# Patient Record
Sex: FEMALE | URBAN - METROPOLITAN AREA
[De-identification: names, ages, dates, MRNs, and addresses within clinical notes are randomized per-mention and may not be internally consistent; named-entity substitution may affect disease eponyms.]

---

## 2021-05-06 ENCOUNTER — OFFICE VISIT (OUTPATIENT)
Dept: OPHTHALMOLOGY | Age: 56
End: 2021-05-06

## 2021-05-06 DIAGNOSIS — H35.363 DRUSEN OF MACULA OF BOTH EYES: Primary | ICD-10-CM

## 2021-05-06 PROCEDURE — 92004 COMPRE OPH EXAM NEW PT 1/>: CPT | Performed by: OPHTHALMOLOGY

## 2021-05-06 PROCEDURE — 92134 CPTRZ OPH DX IMG PST SGM RTA: CPT | Performed by: OPHTHALMOLOGY

## 2021-05-06 RX ORDER — LANSOPRAZOLE 30 MG/1
30 CAPSULE, DELAYED RELEASE ORAL
COMMUNITY
Start: 2021-02-24

## 2021-05-06 RX ORDER — OMEPRAZOLE 20 MG/1
20 CAPSULE, DELAYED RELEASE ORAL
COMMUNITY
Start: 2005-01-04

## 2021-05-06 RX ORDER — TRIAMTERENE AND HYDROCHLOROTHIAZIDE 37.5; 25 MG/1; MG/1
1 CAPSULE ORAL
COMMUNITY
Start: 2014-05-30

## 2021-05-06 RX ORDER — DAPSONE 100 MG/1
100 TABLET ORAL DAILY
COMMUNITY
Start: 2021-04-06

## 2021-05-06 ASSESSMENT — VISUAL ACUITY
OD_CC: 20/20
OS_CC: 20/20
CORRECTION_TYPE: GLASSES
OS_CC+: -2
METHOD: SNELLEN- LINEAR

## 2021-05-06 ASSESSMENT — CONF VISUAL FIELD
OD_NORMAL: 1
OS_NORMAL: 1

## 2021-05-06 ASSESSMENT — EXTERNAL EXAM - RIGHT EYE: OD_EXAM: NORMAL

## 2021-05-06 ASSESSMENT — CUP TO DISC RATIO
OS_RATIO: 0.4
OD_RATIO: 0.4

## 2021-05-06 ASSESSMENT — TONOMETRY
OS_IOP_MMHG: 17
IOP_METHOD: APPLANATION
OD_IOP_MMHG: 18

## 2021-05-06 ASSESSMENT — SLIT LAMP EXAM - LIDS
COMMENTS: NORMAL
COMMENTS: NORMAL

## 2021-05-06 ASSESSMENT — EXTERNAL EXAM - LEFT EYE: OS_EXAM: NORMAL

## 2023-10-19 ENCOUNTER — OFFICE VISIT (OUTPATIENT)
Dept: OBGYN CLINIC | Facility: CLINIC | Age: 58
End: 2023-10-19

## 2023-10-19 ENCOUNTER — TELEPHONE (OUTPATIENT)
Dept: OBGYN CLINIC | Facility: CLINIC | Age: 58
End: 2023-10-19

## 2023-10-19 VITALS — HEIGHT: 59 IN | BODY MASS INDEX: 39.96 KG/M2 | WEIGHT: 198.19 LBS

## 2023-10-19 DIAGNOSIS — Z01.419 ENCOUNTER FOR WELL WOMAN EXAM WITH ROUTINE GYNECOLOGICAL EXAM: Primary | ICD-10-CM

## 2023-10-19 DIAGNOSIS — R30.0 DYSURIA: ICD-10-CM

## 2023-10-19 DIAGNOSIS — Z12.4 ENCOUNTER FOR PAPANICOLAOU SMEAR FOR CERVICAL CANCER SCREENING: ICD-10-CM

## 2023-10-19 DIAGNOSIS — Z12.31 ENCOUNTER FOR SCREENING MAMMOGRAM FOR BREAST CANCER: ICD-10-CM

## 2023-10-19 DIAGNOSIS — B35.4 TINEA CORPORIS: ICD-10-CM

## 2023-10-19 PROBLEM — L03.113 CELLULITIS OF RIGHT UPPER EXTREMITY: Status: ACTIVE | Noted: 2023-03-05

## 2023-10-19 PROBLEM — F32.A DEPRESSION: Status: ACTIVE | Noted: 2023-03-05

## 2023-10-19 PROBLEM — I10 PRIMARY HYPERTENSION: Status: ACTIVE | Noted: 2023-03-05

## 2023-10-19 PROBLEM — K76.0 FATTY LIVER: Status: ACTIVE | Noted: 2018-03-23

## 2023-10-19 PROBLEM — M15.9 PRIMARY OSTEOARTHRITIS INVOLVING MULTIPLE JOINTS: Status: ACTIVE | Noted: 2022-03-29

## 2023-10-19 PROBLEM — F32.5 MAJOR DEPRESSIVE DISORDER WITH SINGLE EPISODE, IN FULL REMISSION (HCC): Status: ACTIVE | Noted: 2021-02-24

## 2023-10-19 PROBLEM — G47.62 NOCTURNAL LEG CRAMPS: Status: ACTIVE | Noted: 2017-11-08

## 2023-10-19 LAB
APPEARANCE: CLEAR
BILIRUBIN: NEGATIVE
GLUCOSE (URINE DIPSTICK): NEGATIVE MG/DL
KETONES (URINE DIPSTICK): NEGATIVE MG/DL
MULTISTIX EXPIRATION DATE: ABNORMAL DATE
MULTISTIX LOT#: ABNORMAL NUMERIC
NITRITE, URINE: POSITIVE
PH, URINE: 8 (ref 4.5–8)
PROTEIN (URINE DIPSTICK): NEGATIVE MG/DL
SPECIFIC GRAVITY: 1.03 (ref 1–1.03)
URINE-COLOR: YELLOW
UROBILINOGEN,SEMI-QN: 0.2 MG/DL (ref 0–1.9)

## 2023-10-19 PROCEDURE — 87086 URINE CULTURE/COLONY COUNT: CPT | Performed by: OBSTETRICS & GYNECOLOGY

## 2023-10-19 PROCEDURE — 87186 SC STD MICRODIL/AGAR DIL: CPT | Performed by: OBSTETRICS & GYNECOLOGY

## 2023-10-19 PROCEDURE — 87077 CULTURE AEROBIC IDENTIFY: CPT | Performed by: OBSTETRICS & GYNECOLOGY

## 2023-10-19 PROCEDURE — 88175 CYTOPATH C/V AUTO FLUID REDO: CPT | Performed by: OBSTETRICS & GYNECOLOGY

## 2023-10-19 RX ORDER — NYSTATIN 100000 U/G
1 CREAM TOPICAL 2 TIMES DAILY
Qty: 30 G | Refills: 1 | Status: SHIPPED | OUTPATIENT
Start: 2023-10-19 | End: 2023-10-19

## 2023-10-19 RX ORDER — NITROFURANTOIN 25; 75 MG/1; MG/1
100 CAPSULE ORAL 2 TIMES DAILY
Qty: 14 CAPSULE | Refills: 0 | Status: SHIPPED | OUTPATIENT
Start: 2023-10-19 | End: 2023-10-26

## 2023-10-19 RX ORDER — NITROFURANTOIN 25; 75 MG/1; MG/1
100 CAPSULE ORAL 2 TIMES DAILY
Qty: 14 CAPSULE | Refills: 0 | Status: SHIPPED | OUTPATIENT
Start: 2023-10-19 | End: 2023-10-19

## 2023-10-19 RX ORDER — NYSTATIN 100000 U/G
1 CREAM TOPICAL 2 TIMES DAILY
Qty: 30 G | Refills: 1 | Status: SHIPPED | OUTPATIENT
Start: 2023-10-19

## 2023-10-25 LAB
.: NORMAL
.: NORMAL

## 2023-10-26 LAB — HPV I/H RISK 1 DNA SPEC QL NAA+PROBE: NEGATIVE

## 2024-05-31 PROCEDURE — 93010 ELECTROCARDIOGRAM REPORT: CPT | Performed by: INTERNAL MEDICINE

## 2024-07-16 NOTE — TELEPHONE ENCOUNTER
Patient verified name and     Patient reports she is experiencing vaginal odor. Denies discharge or irritation. Also reports foul smelling urine. Discussed leaving urine sample at lab to rule out UTI. Agreed. Orders placed.

## 2024-07-16 NOTE — TELEPHONE ENCOUNTER
Patient called to speak with Nurse as she is experiencing vaginal odor that she had before and received prescription. No other symptoms or discharge. [facility] in [place] (on file).

## 2024-07-22 NOTE — TELEPHONE ENCOUNTER
Patient at [facility] lab awaiting order for urine test. Spoke with Nurse today at 12 pm. Fax is [phone].

## 2024-07-22 NOTE — TELEPHONE ENCOUNTER
Name and Date of birth verified    Patient reports still having symptoms. Patient had urinalysis run, but no urine culture. Patient advised she can complete urine culture if still having symptoms. Patient will return for urine culture.

## 2024-07-29 NOTE — TELEPHONE ENCOUNTER
Patient verified name and     Patient informed that results came back normal. Patient requesting prescription for vaginal odor. Metrogel sent, patient to call us if symptoms persist. Voiced understanding and agreed.

## 2024-11-04 NOTE — TELEPHONE ENCOUNTER
Pt reports vaginal odor, no vaginal discharge. Pt has used metrogel for similar symptoms in the past and it was effective. Pt unsure if symptoms are the same and prefers an office visit for evaluation. Pt offered appointment with Dr. Lujan tomorrow. Pt accepts; aware of scheduling details.

## 2024-11-04 NOTE — TELEPHONE ENCOUNTER
Patient has worsening vaginal odor since last week. No current openings with Dr Whatley. Please call.

## 2024-11-05 NOTE — PROGRESS NOTES
[facility]  Obstetrics and Gynecology  Focused Gynecology Problem Exam      Devora Rios is a 59 year old female presenting for Annual (NP, Annual exam /Pt c/o vaginal odor )  .    HPI:     Chief Complaint   Patient presents with    Annual     NP, Annual exam   Pt c/o vaginal odor      Started a week ago  Denies changes to soaps or detergents  Denies PMB  Mild itch; denies abnormal discharge    Having some RAQUEL, worsening with congestion    Last pap: 10/19/2023- nl    Has mammogram scheduled next week  Did colonoscopy a coupole years ago and scheduled for next week    Lives with , feels safe  Work- , teacher's aid, homecare aid    Period Cycle (Days):  (11/5/2024 12:36 PM)  Use of Birth Control (if yes, specify type): Postmenopausal (11/5/2024 12:36 PM)  Hx Prior Abnormal Pap: No (11/5/2024 12:36 PM)  Pap Date: 10/19/23 (11/5/2024 12:36 PM)  Pap Result Notes: negative (11/5/2024 12:36 PM)      Medications (Active prior to today's visit):  Current Outpatient Medications   Medication Sig Dispense Refill    EPINEPHrine 0.3 MG/0.3ML Injection Solution Auto-injector Inject 0.3 mL (1 each total) into the muscle.      nystatin 100,000 Units/g External Cream Apply 1 Application topically 2 (two) times daily. 30 g 0    Cholecalciferol (VITAMIN D) 25 MCG (1000 UT) Oral Tab Take 1 tablet by mouth daily. 60 tablet 0    Calcium 500 MG Oral Tab Take 1,000 mg by mouth daily for 60 doses. 120 tablet 2    montelukast 10 MG Oral Tab Take 1 tablet (10 mg total) by mouth daily.      linaCLOtide (LINZESS) 72 MCG Oral Cap Take 1 tablet by mouth daily.      lansoprazole 30 MG Oral Capsule Delayed Release Take 1 capsule (30 mg total) by mouth Q12H.      hydrOXYzine 25 MG Oral Tab Take 1 tablet (25 mg total) by mouth 3 (three) times daily as needed.      escitalopram 20 MG Oral Tab Take 1 tablet (20 mg total) by mouth daily.      doxycycline 100 MG Oral Cap Take 1 capsule (100 mg total) by mouth Q12H.      dapsone 100 MG  Oral Tab Take 1 tablet (100 mg total) by mouth daily.      cetirizine 10 MG Oral Tab Take 1 tablet (10 mg total) by mouth daily.      betamethasone 0.1 % External Cream Apply 1 Application topically 2 (two) times daily.      albuterol 108 (90 Base) MCG/ACT Inhalation Aero Soln Inhale 2 puffs into the lungs.      nystatin 100,000 Units/g External Cream Apply 1 Tube topically 2 (two) times daily. (Patient not taking: Reported on 2024) 30 g 1     Allergies:  Allergies[1]  HISTORY:     OB History    Para Term  AB Living   3 3 1 2   3   SAB IAB Ectopic Multiple Live Births           3      # Outcome Date GA Lbr Truong/2nd Weight Sex Type Anes PTL Lv   3 Term    7 lb 1 oz (3.204 kg) M Vag-Spont   JOSE   2     4 lb 4 oz (1.928 kg) F Vag-Spont   JOSE   1     4 lb 3 oz (1.899 kg) M Vag-Spont   JOSE         Past Medical History:    Anemia    Bullous dermatosis    Bullous pemphigoid (HCC)    Edema    Esophageal reflux    Fatty liver    Gastroesophageal reflux disease without esophagitis    History of colon polyps    Hyperlipidemia    Impaired fasting glucose    Leiomyoma of uterus    Mixed hyperglyceridemia    Obesity    Type 2 diabetes mellitus without complication, without long-term current use of insulin (HCC)    Vitamin B12 deficiency       Past Surgical History:   Procedure Laterality Date    Colonoscopy  2017 and     D & c  2018    Nonobstetrical    Endometrial ablation      Hysteroscopy  2018    Ir uterine artery embolization      Other surgical history      Gastric Sleeve    Stomach surgery procedure unlisted  2016    Tubal ligation         Family History   Problem Relation Age of Onset    Hypertension Father     Diabetes Mother     Other (Alzheimer) Maternal Grandmother 104    Diabetes Sister     Breast Cancer Maternal Aunt         over 40       Social History     Socioeconomic History    Marital status:      Spouse name: Not on file     Number of children: Not on file    Years of education: Not on file    Highest education level: Not on file   Occupational History    Not on file   Tobacco Use    Smoking status: Never     Passive exposure: Never    Smokeless tobacco: Never   Vaping Use    Vaping status: Never Used   Substance and Sexual Activity    Alcohol use: Never    Drug use: Never    Sexual activity: Not on file   Other Topics Concern    Not on file   Social History Narrative    Not on file     Social Drivers of Health     Financial Resource Strain: Not on file   Food Insecurity: Not on file   Transportation Needs: Not on file   Physical Activity: Not on file   Stress: Not on file   Social Connections: Unknown (3/8/2021)    [facility]    Social Connections     Conversations with friends/family/neighbors per week: Not on file   Housing Stability: Low Risk  (7/7/2021)    [facility]    Housing Stability     Mortgage Payment Concerns?: Not on file     Number of Places Lived in the Last Year: Not on file     Unstable Housing?: Not on file       ROS:   Review of Systems:    Constitutional:    denies fever / chills  Eyes:     denies blurred or double vision  Cardiovascular:  denies chest pain or palpitations  Respiratory:    denies shortness of breath  Gastrointestinal:  denies severe abdominal pain, frequent diarrhea or constipation, nausea / vomiting  Genitourinary:    denies dysuria, bothersome incontinence  Skin/Breast:   denies any breast pain, lumps, or discharge  Neurological:    denies frequent severe headaches  Psychiatric:   denies depression or anxiety, thoughts of harming self or others  Heme/Lymph:    denies easy bruising or bleeding  PHYSICAL EXAM:   /86   Pulse 85   Ht 4' 11\" (1.499 m)   Wt 205 lb (93 kg)   BMI 41.40 kg/m²     GENERAL: well developed, well nourished, in no apparent  distress  ABDOMEN: Soft, non distended; non tender, no masses  GYNE/: External Genitalia: Normal appearing, no lesions. Urethral meatus appear wnl, no abnormal discharge or lesions noted.          Bladder: well supported, urethra wnl, no lesions or fissures                     Vagina: mild atrophy, no lesions, normal clear discharge.                      Uterus: anteverted, mobile, non tender, normal size                     Cervix: Normal                      Adnexa: non tender, no masses, normal size     ASSESSMENT:    Vulvar care and hygiene discussed; will follow up on vaginal swab results. Will do nystatin bid prn    Normal exam.   Recommend pap smear with HPV until age 65 for low-risk patient.  Recommend annual screening mammograms.   Recommend DEXA scan for osteoporosis as indicated.  Discussed importance of frequent weight bearing exercises.  Discussed incorporating supplemental Vitamin D and calcium as needed.  Maintain healthy lifestyle with well-balance diet and daily exercise.  Return to clinic in 1-2 years or as needed.    BMI 41  Recommend intensive lifestyle and behavioral modifications at this time for weight loss  Avoid processed, poor quality carbohydrates, refined grains, flour and sugar  Exercise goals:   Aim for 150 minutes of moderate level exercise weekly with 2-3 days of strength training    Behavior Modification:  Plan meals in advance.  Read nutrition labels    Drink 64 ounces of noncaloric beverages per day no fruit juices regular soda    Maintain  food journal    Utilize portion control strategies to reduce calorie intake  Identify triggers for eating  Try to eat slowly and sitting down. Aim for 20 to 30 minutes to complete a meal     Nutritional Goals :           Calorie-controlled diet: 1500 carmina, Limit carbohydrates to <100 gms per day, Protein goal of 120-130 gms per day.  fiber  25-35g   Goals:  Keep a food log  Aim for 150 minutes of moderate exercise per week  Increase fruit and  vegetable servings to 5 to 6/day  Improve sleep and stress  Weight loss printed instructions/ info given      ICD-10-CM    1. Encounter for well woman exam with routine gynecological exam  Z01.419 Cholecalciferol (VITAMIN D) 25 MCG (1000 UT) Oral Tab     Calcium 500 MG Oral Tab      2. Vaginal odor  N89.8 Vaginitis Vaginosis PCR Panel     nystatin 100,000 Units/g External Cream     Urine Culture, Routine      3. RAQUEL (stress urinary incontinence, female)  N39.3 Urine Culture, Routine      4. Morbid obesity due to excess calories ([facility])  E66.01           PLAN:   Rtc for annual or prn     ORDERS:     Orders Placed This Encounter   Procedures    Vaginitis Vaginosis PCR Panel    Urine Culture, Routine     PRESCRIPTIONS:     Requested Prescriptions     Signed Prescriptions Disp Refills    nystatin 100,000 Units/g External Cream 30 g 0     Sig: Apply 1 Application topically 2 (two) times daily.    Cholecalciferol (VITAMIN D) 25 MCG (1000 UT) Oral Tab 60 tablet 0     Sig: Take 1 tablet by mouth daily.    Calcium 500 MG Oral Tab 120 tablet 2     Sig: Take 1,000 mg by mouth daily for 60 doses.     IMAGING/ REFERRALS:    None     Daphne Lujan MD  11/5/2024  12:45 PM               [1]   Allergies  Allergen Reactions    Amoxicillin ANAPHYLAXIS    Sulfa Antibiotics OTHER (SEE COMMENTS)     Bullous dermatitis

## 2024-11-05 NOTE — PATIENT INSTRUCTIONS
Vulvar Care     Cleaning:  -use plain warm (not hot) water (no soap) to wash if needed or can take Sitz bath (see below)  -use water, fingers, & only very gentle, fragrance-free, moisturizing cleanser      (e.g. Cetaphil or CeraVe hydrating or gentle cleansers meant for eczema/psoriasis & faces)  -only pat dry gently (no rubbing)     Sitz baths:  -soothing and cleansing  -Get a Sitz Bath from [facility] or [facility]--fits over toilet, you can fill with water to soak vulva without having to get in bathtub  -Use 1-3 times per day for ten minutes at a time  -Pat dry, apply thin layer of vaseline to protect the skin     Protection/Prevention:  -goal is to maintain an intact, moist (non-dehydrated) skin barrier  -need to prevent irritation & over-drying of skin that can lead to micro-tears, cracking, and itching, pain, & bleeding.  -do not scratch or wipe hard (mechanical injury)  -avoid strong chemicals/fragrances (fragrance free soap & detergents, avoid fabric softeners)  -avoid other irritants (e.g. sweat, leaked urine, leaked stool)  -avoid excessive friction (no thongs, always use lubricant for intercourse, daily barrier cream or ointment)  -breathable underwear, change underwear if sweaty/wet, no underwear while sleeping if possible.  -DO USE a barrier product for protection       (e.g. Aquaphor, vaseline, A&D ointment, Zinc oxide, diaper rash cream) at least once daily  -DO NOT use any instrument (including fingers) in the anus first and then in the vagina. This will cause a bacterial infection of the vagina.     Treatment:  -BEST TREATMENT IS PREVENTION  -ointments are generally more potent than creams  -use just a thin layer  -during treatment (usually at least 2 wk) it is best to avoid intercourse to avoid trauma to the skin  -if diagnosed with chronic inflammatory skin condition (e.g. lichen sclerosis)         Most important is avoiding scratching & irritants         Work with gynecologist to form a steroid  regimen for long term use         Often will need daily strong topical steroid (prescription) for at least 12 weeks.           Best to try to taper down to 2-3 times per week or weekly if able & can increase use in a flare  -if sensitive/dry skin         Add back some oils &/or moisture on a regular basis         Coconut oil is pure (no irritants) & contains ceramides (fatty acids) that are             important for maintaining skin barrier         Hyaluronic acid (there are suppositories for intravaginal use e.g Revaree)         Vaginal moisturizer products can be used topically as well (e.g. Replens, Luvena)         Still recommend a barrier product in addition (on top of the above)     If you are tempted to scratch:  -place ice pack on area for 15-20 minutes & distract yourself.  -if needed can (sparingly) place a thin layer of lidocaine ointment or cream        (e.g. Bikini-zone, etc over the counter)  -can take an oral anti-histamine (e.g. Benadryl, Claritin, Zyrtec, etc)  -can also use a topical steroid (hydrocortisone ointment over the counter) for a few days        Take care - chronic steroid use can cause skin thinning & can worsen your problem.

## 2024-11-07 NOTE — TELEPHONE ENCOUNTER
Pt name and  verified     Pt informed of results and recommendations. Flagyl Rx sent to pharmacy. Alcohol precautions advised. Pt verbalized understanding and agreed.  Pt aware to call office if symptoms do not resolve.

## 2024-11-08 PROCEDURE — 93010 ELECTROCARDIOGRAM REPORT: CPT | Performed by: INTERNAL MEDICINE

## 2025-06-20 ENCOUNTER — APPOINTMENT (OUTPATIENT)
Dept: OCCUPATIONAL MEDICINE | Age: 60
End: 2025-06-20

## 2025-06-20 VITALS
HEART RATE: 101 BPM | BODY MASS INDEX: 41.12 KG/M2 | WEIGHT: 204 LBS | DIASTOLIC BLOOD PRESSURE: 76 MMHG | HEIGHT: 59 IN | SYSTOLIC BLOOD PRESSURE: 109 MMHG

## 2025-06-20 DIAGNOSIS — Z02.89 VISIT FOR OCCUPATIONAL HEALTH EXAMINATION: Primary | ICD-10-CM

## 2025-06-20 LAB
APPEARANCE UR: CLEAR
BILIRUB UR QL STRIP: NEGATIVE MG/DL
COLOR UR: YELLOW
GLUCOSE UR-SCNC: NEGATIVE MMOL/L
KETONES UR QL STRIP: NEGATIVE MMOL/L
NITRITE UR STRIP-MCNC: NEGATIVE MG/DL
PH UR: 7.5 [PH]
PROT UR STRIP-MCNC: NEGATIVE G/L
RBC # UR STRIP: NEGATIVE ERY/UL
SP GR UR: 1.02
UROBILINOGEN UR-MCNC: 0.2 MG/DL
WBC # UR: ABNORMAL LEU/UL